# Patient Record
Sex: FEMALE | Race: WHITE
[De-identification: names, ages, dates, MRNs, and addresses within clinical notes are randomized per-mention and may not be internally consistent; named-entity substitution may affect disease eponyms.]

---

## 2024-02-09 PROBLEM — Z00.00 ENCOUNTER FOR PREVENTIVE HEALTH EXAMINATION: Status: ACTIVE | Noted: 2024-02-09

## 2024-02-15 ENCOUNTER — APPOINTMENT (OUTPATIENT)
Dept: SURGERY | Facility: CLINIC | Age: 64
End: 2024-02-15
Payer: COMMERCIAL

## 2024-02-15 VITALS
HEIGHT: 66 IN | BODY MASS INDEX: 23.63 KG/M2 | DIASTOLIC BLOOD PRESSURE: 87 MMHG | SYSTOLIC BLOOD PRESSURE: 144 MMHG | WEIGHT: 147 LBS

## 2024-02-15 DIAGNOSIS — Z78.9 OTHER SPECIFIED HEALTH STATUS: ICD-10-CM

## 2024-02-15 DIAGNOSIS — Z86.79 PERSONAL HISTORY OF OTHER DISEASES OF THE CIRCULATORY SYSTEM: ICD-10-CM

## 2024-02-15 DIAGNOSIS — R22.1 LOCALIZED SWELLING, MASS AND LUMP, NECK: ICD-10-CM

## 2024-02-15 PROCEDURE — 99203 OFFICE O/P NEW LOW 30 MIN: CPT

## 2024-02-18 PROBLEM — Z78.9 NON-SMOKER: Status: ACTIVE | Noted: 2024-02-18

## 2024-02-18 PROBLEM — Z86.79 HISTORY OF HYPERTENSION: Status: RESOLVED | Noted: 2024-02-18 | Resolved: 2024-02-18

## 2024-02-18 PROBLEM — Z78.9 DENIES ALCOHOL CONSUMPTION: Status: ACTIVE | Noted: 2024-02-18

## 2024-02-18 RX ORDER — VALSARTAN AND HYDROCHLOROTHIAZIDE 160; 12.5 MG/1; MG/1
160-12.5 TABLET, FILM COATED ORAL
Qty: 30 | Refills: 0 | Status: ACTIVE | COMMUNITY
Start: 2023-07-28

## 2024-02-18 NOTE — CONSULT LETTER
[Dear  ___] : Dear  [unfilled], [Consult Letter:] : I had the pleasure of evaluating your patient, [unfilled]. [Please see my note below.] : Please see my note below. [Consult Closing:] : Thank you very much for allowing me to participate in the care of this patient.  If you have any questions, please do not hesitate to contact me. [Sincerely,] : Sincerely, [FreeTextEntry2] : Dr. Edson Miller [FreeTextEntry3] : Jovanna Barry MD, FACS Assistant Professor of Surgery and Otolaryngology [facility]

## 2024-02-18 NOTE — PHYSICAL EXAM
[de-identified] : Left anterior neck 1 cm soft tissue fullness without discrete mass.  No cervical or supraclavicular adenopathy, trachea midline, thyroid without enlargement or palpable mass [Normal] : no neck adenopathy [de-identified] : Skin:  normal appearance.  no rash, nodules, vesicles, or erythema, Musculoskeletal:  full range of motion and no deformities appreciated Neurological:  grossly intact Psychiatric:  oriented to person, place and time with appropriate affect

## 2024-02-18 NOTE — HISTORY OF PRESENT ILLNESS
[de-identified] : Patient referred by Dr. Miller for evaluation of left anterior neck mass.  2 years ago patient noted anterior neck nodule with increase in size.  Denies prior history of thyroid disease, dysphagia, change in voice or radiation exposure.  Thyroid ultrasound July 2023: Right lobe 5 x 1.6 x 1.4 cm, no nodules noted.  Left lobe 4.1 x 1.3 x 0.7 cm with 4 mm colloid nodule.  No nodule noted in the area of patient's concern.  Blood work April 2023: TSH 1.9, calcium 9.6. I have reviewed all old and new data and available images.

## 2024-02-18 NOTE — ASSESSMENT
[FreeTextEntry1] : Patient with 2-year history of left anterior neck swelling.  I do not appreciate any suspicious findings on physical examination today.  No mass was noted on recent imaging.  Possible small lipoma.  No suspicious findings.  Patient reassured.  No intervention needed.  I have answered her questions to the best my ability.

## 2024-02-23 PROBLEM — R22.1 NECK MASS: Status: ACTIVE | Noted: 2024-02-15

## 2025-05-23 ENCOUNTER — NON-APPOINTMENT (OUTPATIENT)
Age: 65
End: 2025-05-23